# Patient Record
Sex: FEMALE | Race: WHITE | Employment: OTHER | URBAN - METROPOLITAN AREA
[De-identification: names, ages, dates, MRNs, and addresses within clinical notes are randomized per-mention and may not be internally consistent; named-entity substitution may affect disease eponyms.]

---

## 2019-12-05 ENCOUNTER — OFFICE VISIT (OUTPATIENT)
Dept: OTOLARYNGOLOGY | Facility: CLINIC | Age: 69
End: 2019-12-05
Payer: COMMERCIAL

## 2019-12-05 VITALS
WEIGHT: 93.9 LBS | TEMPERATURE: 97.4 F | HEART RATE: 92 BPM | HEIGHT: 56 IN | OXYGEN SATURATION: 97 % | BODY MASS INDEX: 21.12 KG/M2

## 2019-12-05 DIAGNOSIS — Z98.890 POSTOPERATIVE STATE: Primary | ICD-10-CM

## 2019-12-05 PROCEDURE — 99024 POSTOP FOLLOW-UP VISIT: CPT | Performed by: OTOLARYNGOLOGY

## 2019-12-05 RX ORDER — ESCITALOPRAM OXALATE 10 MG/1
TABLET ORAL EVERY 24 HOURS
COMMUNITY

## 2019-12-05 RX ORDER — LISINOPRIL 20 MG/1
20 TABLET ORAL
COMMUNITY

## 2019-12-05 RX ORDER — GINSENG 100 MG
1 CAPSULE ORAL
COMMUNITY
Start: 2019-07-19

## 2019-12-05 ASSESSMENT — MIFFLIN-ST. JEOR: SCORE: 810.99

## 2019-12-05 ASSESSMENT — PAIN SCALES - GENERAL: PAINLEVEL: NO PAIN (0)

## 2019-12-05 NOTE — PROGRESS NOTES
"Otolaryngology Post-Op / Progress Note    Triny Galeas is a 69 year old female who is status post Septoplasty surgery on 11/29/19.  Triny IS Post-operative day #5.    Triny is Doing well.  Clean wound without signs of infection.  No excessive bleeding  Pain well-controlled..         Review of Systems:    The patient denies any chest pain, shortness of breath, excessive pain, fever, chills, purulent drainage from the wound, nausea or vomiting.         Medications:     Current Outpatient Medications   Medication Sig     bacitracin 500 UNIT/GM OINT Apply 1 Application topically     aspirin (ASPIRIN) 81 MG EC tablet Aspir-81   1 tablet by mouth daily     escitalopram (LEXAPRO) 10 MG tablet every 24 hours     lisinopril (PRINIVIL/ZESTRIL) 20 MG tablet Take 20 mg by mouth     No current facility-administered medications for this visit.             Physical Exam:   All vitals have been reviewed  Patient Vitals for the past 24 hrs:   Temp Temp src Pulse SpO2 Height Weight   12/05/19 1506 97.4  F (36.3  C) Temporal 92 97 % 1.41 m (4' 7.5\") 42.6 kg (93 lb 14.4 oz)       General - The patient is well nourished and well developed, and appears to have good nutritional status.  Alert and oriented to person and place, answers questions and cooperates with examination appropriately.     Head and Face - Normocephalic and atraumatic, with no gross asymmetry noted of the contour of the facial features.  The facial nerve is intact, with strong symmetric movements.    Eyes - Extraocular movements intact, and the pupils were reactive to light.  Sclera were not icteric or injected, conjunctiva were pink and moist.    Mouth - Examination of the oral cavity shows pink, healthy, moist mucosa.  No lesions or ulceration noted.  The dentition are in good repair.  The tongue is mobile and midline.    Nose -  External contour is symmetric, no gross deflection or scars.  Nasal mucosa is pink and moist with no abnormal mucus.  The " septum was midline and non-obstructive, turbinates of normal size and position.  No polyps, masses, or purulence noted on examination.    Preformed in Clinic  Splints were removed today without difficulty      Assessment and Plan: Patient overall is doing well after her septal surgery and submucous resection of turbinates.  She will continue saline irrigations for the next 3 weeks she has back in 6 weeks at [facility] [place]..    Micah Houston M.D.